# Patient Record
Sex: FEMALE | Race: WHITE
[De-identification: names, ages, dates, MRNs, and addresses within clinical notes are randomized per-mention and may not be internally consistent; named-entity substitution may affect disease eponyms.]

---

## 2020-04-10 ENCOUNTER — HOSPITAL ENCOUNTER (OUTPATIENT)
Dept: HOSPITAL 62 - LC | Age: 30
Discharge: HOME | End: 2020-04-10
Attending: OBSTETRICS & GYNECOLOGY
Payer: COMMERCIAL

## 2020-04-10 DIAGNOSIS — Z3A.23: ICD-10-CM

## 2020-04-10 DIAGNOSIS — O47.02: Primary | ICD-10-CM

## 2020-04-10 LAB
ADD MANUAL DIFF: NO
APPEARANCE UR: CLEAR
APTT PPP: YELLOW S
BARBITURATES UR QL SCN: NEGATIVE
BASOPHILS # BLD AUTO: 0 10^3/UL (ref 0–0.2)
BASOPHILS NFR BLD AUTO: 0.1 % (ref 0–2)
BILIRUB UR QL STRIP: NEGATIVE
EOSINOPHIL # BLD AUTO: 0 10^3/UL (ref 0–0.6)
EOSINOPHIL NFR BLD AUTO: 0.1 % (ref 0–6)
ERYTHROCYTE [DISTWIDTH] IN BLOOD BY AUTOMATED COUNT: 13.5 % (ref 11.5–14)
GLUCOSE UR STRIP-MCNC: NEGATIVE MG/DL
HCT VFR BLD CALC: 33.8 % (ref 36–47)
HGB BLD-MCNC: 12 G/DL (ref 12–15.5)
KETONES UR STRIP-MCNC: NEGATIVE MG/DL
LYMPHOCYTES # BLD AUTO: 1.5 10^3/UL (ref 0.5–4.7)
LYMPHOCYTES NFR BLD AUTO: 8.7 % (ref 13–45)
MCH RBC QN AUTO: 32.6 PG (ref 27–33.4)
MCHC RBC AUTO-ENTMCNC: 35.3 G/DL (ref 32–36)
MCV RBC AUTO: 92 FL (ref 80–97)
METHADONE UR QL SCN: NEGATIVE
MONOCYTES # BLD AUTO: 0.7 10^3/UL (ref 0.1–1.4)
MONOCYTES NFR BLD AUTO: 3.8 % (ref 3–13)
NEUTROPHILS # BLD AUTO: 15.3 10^3/UL (ref 1.7–8.2)
NEUTS SEG NFR BLD AUTO: 87.3 % (ref 42–78)
NITRITE UR QL STRIP: NEGATIVE
PCP UR QL SCN: NEGATIVE
PH UR STRIP: 6 [PH] (ref 5–9)
PLATELET # BLD: 220 10^3/UL (ref 150–450)
PROT UR STRIP-MCNC: NEGATIVE MG/DL
RBC # BLD AUTO: 3.67 10^6/UL (ref 3.72–5.28)
SP GR UR STRIP: 1.01
TOTAL CELLS COUNTED % (AUTO): 100 %
URINE AMPHETAMINES SCREEN: NEGATIVE
URINE BENZODIAZEPINES SCREEN: NEGATIVE
URINE COCAINE SCREEN: NEGATIVE
URINE MARIJUANA (THC) SCREEN: NEGATIVE
UROBILINOGEN UR-MCNC: NEGATIVE MG/DL (ref ?–2)
WBC # BLD AUTO: 17.5 10^3/UL (ref 4–10.5)

## 2020-04-10 PROCEDURE — 86592 SYPHILIS TEST NON-TREP QUAL: CPT

## 2020-04-10 PROCEDURE — 86901 BLOOD TYPING SEROLOGIC RH(D): CPT

## 2020-04-10 PROCEDURE — 86803 HEPATITIS C AB TEST: CPT

## 2020-04-10 PROCEDURE — 81001 URINALYSIS AUTO W/SCOPE: CPT

## 2020-04-10 PROCEDURE — 86850 RBC ANTIBODY SCREEN: CPT

## 2020-04-10 PROCEDURE — 86762 RUBELLA ANTIBODY: CPT

## 2020-04-10 PROCEDURE — 86804 HEP C AB TEST CONFIRM: CPT

## 2020-04-10 PROCEDURE — 87340 HEPATITIS B SURFACE AG IA: CPT

## 2020-04-10 PROCEDURE — 76815 OB US LIMITED FETUS(S): CPT

## 2020-04-10 PROCEDURE — 36415 COLL VENOUS BLD VENIPUNCTURE: CPT

## 2020-04-10 PROCEDURE — 85025 COMPLETE CBC W/AUTO DIFF WBC: CPT

## 2020-04-10 PROCEDURE — 86900 BLOOD TYPING SEROLOGIC ABO: CPT

## 2020-04-10 PROCEDURE — 80307 DRUG TEST PRSMV CHEM ANLYZR: CPT

## 2020-04-10 PROCEDURE — 86701 HIV-1ANTIBODY: CPT

## 2020-04-10 NOTE — RADIOLOGY REPORT (SQ)
EXAM DESCRIPTION:

US PREGNANCY LIMITED  



COMPLETED DATE/TME:  04/10/2020 00:00



CLINICAL HISTORY:

30 years Female, rule out  labor



Comparison: None.



TECHNIQUE/LIMITATION: Targeted OB sonogram for requested

parameters only.



FINDINGS:



Single IUP



EGA is 23w6d with ARVIN of 2020

EFW is 596g at 36% (Herb)

Cardiac activity: 157-bpm.



LVP: 6.6 x 6.6-cm with vernix

Placenta: Anterior. No demonstrated abruption or previa. 

(Imaging note:  Ultrasound does not detect most cases of

abruption and "placental abruption" is considered a clinical

diagnosis.)

Fetal Presentation: Breech.

Cervical length: 3.8-cm. Closed appearance.



IMPRESSION:



Targeted OB sonogram for requested parameters

## 2020-04-11 LAB — HCV AB SER IA-ACNC: <0.1 S/CO RATIO (ref 0–0.9)
